# Patient Record
Sex: FEMALE | Race: WHITE | NOT HISPANIC OR LATINO | URBAN - METROPOLITAN AREA
[De-identification: names, ages, dates, MRNs, and addresses within clinical notes are randomized per-mention and may not be internally consistent; named-entity substitution may affect disease eponyms.]

---

## 2020-06-01 ENCOUNTER — OFFICE VISIT (OUTPATIENT)
Dept: URBAN - METROPOLITAN AREA CLINIC 63 | Facility: CLINIC | Age: 70
End: 2020-06-01

## 2020-06-09 ENCOUNTER — OFFICE VISIT (OUTPATIENT)
Dept: URBAN - METROPOLITAN AREA SURGERY CENTER 4 | Facility: SURGERY CENTER | Age: 70
End: 2020-06-09

## 2020-06-10 LAB — PATHOLOGY (INDENTED REPORT): (no result)

## 2020-06-29 ENCOUNTER — OFFICE VISIT (OUTPATIENT)
Dept: URBAN - METROPOLITAN AREA CLINIC 63 | Facility: CLINIC | Age: 70
End: 2020-06-29

## 2022-07-09 ENCOUNTER — TELEPHONE ENCOUNTER (OUTPATIENT)
Dept: URBAN - METROPOLITAN AREA CLINIC 121 | Facility: CLINIC | Age: 72
End: 2022-07-09

## 2022-07-09 RX ORDER — SIMVASTATIN 40 MG/1
TABLET, FILM COATED ORAL
Refills: 0 | OUTPATIENT
Start: 2010-08-31 | End: 2020-06-01

## 2022-07-09 RX ORDER — RISEDRONATE SODIUM 129; 21 MG/1; MG/1
TABLET, FILM COATED ORAL
Refills: 0 | OUTPATIENT
Start: 2010-08-10 | End: 2010-08-31

## 2022-07-09 RX ORDER — SIMVASTATIN 40 MG/1
TABLET, FILM COATED ORAL
Refills: 0 | OUTPATIENT
Start: 2010-08-10 | End: 2010-08-31

## 2022-07-09 RX ORDER — RISEDRONATE SODIUM 129; 21 MG/1; MG/1
TABLET, FILM COATED ORAL
Refills: 0 | OUTPATIENT
Start: 2010-08-31 | End: 2014-08-11

## 2022-07-10 ENCOUNTER — TELEPHONE ENCOUNTER (OUTPATIENT)
Dept: URBAN - METROPOLITAN AREA CLINIC 121 | Facility: CLINIC | Age: 72
End: 2022-07-10

## 2022-07-10 RX ORDER — AMOXICILLIN 500 MG/1
TABLET, FILM COATED ORAL
Refills: 0 | Status: ACTIVE | COMMUNITY
Start: 2010-08-31

## 2022-07-10 RX ORDER — CLARITHROMYCIN 500 MG/1
TABLET ORAL TWICE A DAY
Refills: 0 | Status: ACTIVE | COMMUNITY
Start: 2010-08-31

## 2022-07-10 RX ORDER — OMEPRAZOLE 20 MG/1
CAPSULE, DELAYED RELEASE ORAL TWICE A DAY
Refills: 0 | Status: ACTIVE | COMMUNITY
Start: 2010-08-31

## 2024-04-16 ENCOUNTER — LAB (OUTPATIENT)
Dept: URBAN - METROPOLITAN AREA CLINIC 60 | Facility: CLINIC | Age: 74
End: 2024-04-16

## 2024-04-16 ENCOUNTER — OV NP (OUTPATIENT)
Dept: URBAN - METROPOLITAN AREA CLINIC 60 | Facility: CLINIC | Age: 74
End: 2024-04-16
Payer: MEDICARE

## 2024-04-16 VITALS
BODY MASS INDEX: 19.83 KG/M2 | DIASTOLIC BLOOD PRESSURE: 84 MMHG | OXYGEN SATURATION: 99 % | WEIGHT: 119 LBS | SYSTOLIC BLOOD PRESSURE: 128 MMHG | HEIGHT: 65 IN | TEMPERATURE: 98.2 F | HEART RATE: 78 BPM

## 2024-04-16 DIAGNOSIS — R13.19 ESOPHAGEAL DYSPHAGIA: ICD-10-CM

## 2024-04-16 DIAGNOSIS — K21.9 CHRONIC GERD: ICD-10-CM

## 2024-04-16 DIAGNOSIS — Z12.11 COLON CANCER SCREENING: ICD-10-CM

## 2024-04-16 PROBLEM — 235595009: Status: ACTIVE | Noted: 2024-04-16

## 2024-04-16 PROCEDURE — 99204 OFFICE O/P NEW MOD 45 MIN: CPT

## 2024-04-16 RX ORDER — ONDANSETRON HYDROCHLORIDE 4 MG/1
1 TABLET TABLET, FILM COATED ORAL
Qty: 2 | Refills: 0 | OUTPATIENT
Start: 2024-04-16

## 2024-04-16 RX ORDER — HYDROCHLOROTHIAZIDE 12.5 MG/1
TAKE ONE CAPSULE BY MOUTH TWICE A DAY CAPSULE ORAL
Qty: 180 UNSPECIFIED | Refills: 1 | Status: ACTIVE | COMMUNITY

## 2024-04-16 NOTE — HPI-ZZZTODAY'S VISIT
Scarlett is a very pleasant 73-year-old female who presents for colonoscopy.  She is previously a patient of Dr. Dorado.  Last seen 6/29/2020.  She will be establishing with Dr. Ortega today.  Past medical history significant for GERD, gastritis, dysphagia.  Past surgical history reviewed.  Family history significant for colon polyps.  Last colonoscopy 2014.  Last endoscopy with dilation 6/9/2020.  Patient presents for colonoscopy screening.  She relates the only change in bowel habits is she has more frequent bowel movements after her cholecystectomy but they are well-managed.  She has had acid reflux previously but none currently.  She has noted 3 weeks of difficulty swallowing.  She has a feeling that food gets stuck (points to cricopharyngeal area).  She denies dyspepsia, pyrosis, abdominal pain, change in bowel habits, unintentional weight loss, melena, hematochezia.

## 2024-04-16 NOTE — HPI-PREVIOUS PROCEDURES
Endoscopy with dilation 6/9/2020 consistent with no endoscopic esophageal abnormality to explain dysphagia esophageal dilated with Pendleton dilator with no resistance at 56 Faro[place] Gastritis consistent with mild chronic inactive gastritis Normal duodenal bulb first portion of duodenum and second portion duodenum Colonoscopy 9/9/2014 consistent with internal hemorrhoids Examination otherwise normal Recall for 10 years

## 2024-04-16 NOTE — PHYSICAL EXAM CONSTITUTIONAL:
well developed, well nourished , in no acute distress , ambulating without difficulty , normal communication ability
Ambulatory

## 2024-04-23 ENCOUNTER — LAB (OUTPATIENT)
Dept: URBAN - METROPOLITAN AREA CLINIC 60 | Facility: CLINIC | Age: 74
End: 2024-04-23

## 2024-05-02 ENCOUNTER — TELEPHONE ENCOUNTER (OUTPATIENT)
Dept: URBAN - METROPOLITAN AREA CLINIC 64 | Facility: CLINIC | Age: 74
End: 2024-05-02

## 2024-06-06 ENCOUNTER — OFFICE VISIT (OUTPATIENT)
Dept: URBAN - METROPOLITAN AREA SURGERY CENTER 4 | Facility: SURGERY CENTER | Age: 74
End: 2024-06-06

## 2024-06-12 ENCOUNTER — CLAIMS CREATED FROM THE CLAIM WINDOW (OUTPATIENT)
Dept: URBAN - METROPOLITAN AREA CLINIC 4 | Facility: CLINIC | Age: 74
End: 2024-06-12
Payer: MEDICARE

## 2024-06-12 ENCOUNTER — OUT OF OFFICE VISIT (OUTPATIENT)
Dept: URBAN - METROPOLITAN AREA SURGERY CENTER 4 | Facility: SURGERY CENTER | Age: 74
End: 2024-06-12
Payer: MEDICARE

## 2024-06-12 DIAGNOSIS — K64.1 SECOND DEGREE HEMORRHOIDS: ICD-10-CM

## 2024-06-12 DIAGNOSIS — Z12.11 COLON CANCER SCREENING: ICD-10-CM

## 2024-06-12 DIAGNOSIS — K63.89 OTHER SPECIFIED DISEASES OF INTESTINE: ICD-10-CM

## 2024-06-12 DIAGNOSIS — D12.2 ADENOMATOUS POLYP OF ASCENDING COLON: ICD-10-CM

## 2024-06-12 DIAGNOSIS — Z12.11 ENCOUNTER FOR SCREENING FOR MALIGNANT NEOPLASM OF COLON: ICD-10-CM

## 2024-06-12 DIAGNOSIS — K63.5 POLYP OF ASCENDING COLON, UNSPECIFIED TYPE: ICD-10-CM

## 2024-06-12 PROCEDURE — 45385 COLONOSCOPY W/LESION REMOVAL: CPT | Performed by: INTERNAL MEDICINE

## 2024-06-12 PROCEDURE — 45385 COLONOSCOPY W/LESION REMOVAL: CPT | Performed by: CLINIC/CENTER

## 2024-06-12 PROCEDURE — 00811 ANES LWR INTST NDSC NOS: CPT | Performed by: NURSE ANESTHETIST, CERTIFIED REGISTERED

## 2024-06-12 PROCEDURE — 88305 TISSUE EXAM BY PATHOLOGIST: CPT | Performed by: PATHOLOGY

## 2024-06-12 RX ORDER — HYDROCHLOROTHIAZIDE 12.5 MG/1
TAKE ONE CAPSULE BY MOUTH TWICE A DAY CAPSULE ORAL
Qty: 180 UNSPECIFIED | Refills: 1 | Status: ACTIVE | COMMUNITY

## 2024-06-12 RX ORDER — ONDANSETRON HYDROCHLORIDE 4 MG/1
1 TABLET TABLET, FILM COATED ORAL
Qty: 2 | Refills: 0 | Status: ACTIVE | COMMUNITY
Start: 2024-04-16

## 2024-06-25 ENCOUNTER — OFFICE VISIT (OUTPATIENT)
Dept: URBAN - METROPOLITAN AREA CLINIC 60 | Facility: CLINIC | Age: 74
End: 2024-06-25
Payer: MEDICARE

## 2024-06-25 ENCOUNTER — DASHBOARD ENCOUNTERS (OUTPATIENT)
Age: 74
End: 2024-06-25

## 2024-06-25 VITALS
HEIGHT: 65 IN | OXYGEN SATURATION: 99 % | DIASTOLIC BLOOD PRESSURE: 78 MMHG | TEMPERATURE: 97.9 F | SYSTOLIC BLOOD PRESSURE: 124 MMHG | WEIGHT: 117 LBS | HEART RATE: 91 BPM | BODY MASS INDEX: 19.49 KG/M2

## 2024-06-25 DIAGNOSIS — K21.9 CHRONIC GERD: ICD-10-CM

## 2024-06-25 DIAGNOSIS — Z86.010 PERSONAL HISTORY OF COLONIC POLYPS: ICD-10-CM

## 2024-06-25 DIAGNOSIS — R13.19 ESOPHAGEAL DYSPHAGIA: ICD-10-CM

## 2024-06-25 PROBLEM — 428283002: Status: ACTIVE | Noted: 2024-06-25

## 2024-06-25 PROCEDURE — 99214 OFFICE O/P EST MOD 30 MIN: CPT

## 2024-06-25 RX ORDER — ROSUVASTATIN CALCIUM 5 MG/1
TABLET, FILM COATED ORAL
Qty: 90 TABLET | Status: ACTIVE | COMMUNITY

## 2024-06-25 RX ORDER — HYDROCHLOROTHIAZIDE 12.5 MG/1
TAKE ONE CAPSULE BY MOUTH TWICE A DAY CAPSULE ORAL
Qty: 180 UNSPECIFIED | Refills: 1 | Status: ACTIVE | COMMUNITY

## 2024-06-25 RX ORDER — UBIDECARENONE 30 MG
AS DIRECTED CAPSULE ORAL
Status: ACTIVE | COMMUNITY

## 2024-06-25 RX ORDER — DENOSUMAB 60 MG/ML
INJECTION SUBCUTANEOUS
Qty: 1 MILLILITER | Status: ACTIVE | COMMUNITY

## 2024-06-25 NOTE — HPI-ZZZTODAY'S VISIT
Patient is a very pleasant 73-year-old female who presents today for follow up on dysphagia.  She is a patient of Dr. Ortega.  I last saw her on 4/16/2024.  Past medical history significant for hypertension, GERD, gastritis, dysphagia.  Past surgical history reviewed family history noncontributory.  Last colonoscopy 6/12/2024.  Last endoscopy with dilation 6/9/2020. Previously patient was relating change in bowel habits with more frequent bowel movements after her cholecystectomy that she felt she was able to manage.  She had acid reflux previously but none at time of visit.  She had 3 weeks of difficulty swallowing and feeling like food got stuck.  For this reason swallow studies were ordered for further evaluation.  Patient has no GI complaints at today's visit. When asked about dysphagia, she states that she currently does not have any problems with swallowing and does not want to complete the swallow study. She has no new issues with her BMs or acid reflux.  Patient denies fever, change in appetite, abdominal pain, nausea, vomiting, diarrhea, constipation, hematemesis, hematochezia, melena, change in stool frequency/caliber, unintentional weight loss/gain.

## 2024-06-25 NOTE — HPI-PREVIOUS PROCEDURES
6/12/2024 colonoscopy consistent with nonthrombosed external and internal hemorrhoids prolapse with straining (grade 2) Examined portion of ileum normal 10 mm polyp in ascending colon consistent with benign mucosal polyp Nonbleeding external and internal hemorrhoids Examination otherwise normal Recall for 5 years  . Endoscopy with dilation 6/9/2020 consistent with no endoscopic esophageal abnormality to explain dysphagia esophageal dilated with Pendleton dilator with no resistance at 56 [place] Gastritis consistent with mild chronic inactive gastritis Normal duodenal bulb first portion of duodenum and second portion duodenum Colonoscopy 9/9/2014 consistent with internal hemorrhoids Examination otherwise normal Recall for 10 years

## 2025-06-09 ENCOUNTER — OFFICE VISIT (OUTPATIENT)
Dept: URBAN - METROPOLITAN AREA CLINIC 60 | Facility: CLINIC | Age: 75
End: 2025-06-09
Payer: MEDICARE

## 2025-06-09 DIAGNOSIS — K21.9 CHRONIC GERD: ICD-10-CM

## 2025-06-09 DIAGNOSIS — Z86.0102 HISTORY OF HYPERPLASTIC POLYP OF COLON: ICD-10-CM

## 2025-06-09 DIAGNOSIS — R13.19 ESOPHAGEAL DYSPHAGIA: ICD-10-CM

## 2025-06-09 PROCEDURE — 99214 OFFICE O/P EST MOD 30 MIN: CPT

## 2025-06-09 RX ORDER — UBIDECARENONE 30 MG
AS DIRECTED CAPSULE ORAL
Status: ACTIVE | COMMUNITY

## 2025-06-09 RX ORDER — DENOSUMAB 60 MG/ML
INJECTION SUBCUTANEOUS
Qty: 1 MILLILITER | Status: ACTIVE | COMMUNITY

## 2025-06-09 RX ORDER — ROSUVASTATIN CALCIUM 5 MG/1
TABLET, FILM COATED ORAL
Qty: 90 TABLET | Status: ACTIVE | COMMUNITY

## 2025-06-09 RX ORDER — HYDROCHLOROTHIAZIDE 12.5 MG/1
TAKE ONE CAPSULE BY MOUTH TWICE A DAY CAPSULE ORAL
Qty: 180 UNSPECIFIED | Refills: 1 | Status: ACTIVE | COMMUNITY

## 2025-06-09 NOTE — HPI-PREVIOUS PROCEDURES
6/12/2024 colonoscopy consistent with nonthrombosed external and internal hemorrhoids prolapse with straining (grade 2) Examined portion of ileum normal 10 mm polyp in ascending colon consistent with benign mucosal polyp Nonbleeding external and internal hemorrhoids Examination otherwise normal Recall for 5 years  . Endoscopy with dilation 6/9/2020 consistent with no endoscopic esophageal abnormality to explain dysphagia esophageal dilated with Pendleton dilator with no resistance at 56 [place] Gastritis consistent with mild chronic inactive gastritis Normal duodenal bulb first portion of duodenum and second portion duodenum Colonoscopy 9/9/2014 consistent with internal hemorrhoids Examination otherwise normal Recall for 10 years

## 2025-06-09 NOTE — HPI-ZZZTODAY'S VISIT
Patient is a very pleasant 74-year-old female who presents today for follow up..  She is a patient of Dr. Ortega. Last seen by Joselyn Wilkinson PA-C 6/2024.  Past medical history significant for hypertension, GERD, gastritis, dysphagia.  Past surgical history reviewed family history noncontributory.  Last colonoscopy 6/12/2024.  Last endoscopy with dilation 6/9/2020. Previously patient was relating change in bowel habits with more frequent bowel movements after her cholecystectomy that she felt she was able to manage.   Patient has no GI complaints at today's visit. She is doing well. She reports she was told to come back in a year but unsure why. I review last office note which indicates patient to return prn. Unsure why she is back. No complaints. BM regular. Due for repeat colonoscopy in 2029.   Patient denies fever, change in appetite, abdominal pain, nausea, vomiting, diarrhea, constipation, hematemesis, hematochezia, melena, change in stool frequency/caliber, unintentional weight loss/gain.